# Patient Record
Sex: FEMALE | Race: BLACK OR AFRICAN AMERICAN | URBAN - METROPOLITAN AREA
[De-identification: names, ages, dates, MRNs, and addresses within clinical notes are randomized per-mention and may not be internally consistent; named-entity substitution may affect disease eponyms.]

---

## 2017-02-06 NOTE — PATIENT DISCUSSION
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 Advised patient to call our office with decreased vision or increased symptoms.

## 2017-02-06 NOTE — PATIENT DISCUSSION
(T64.357) Vitreous degeneration, bilateral - Assesment : Examination revealed PVD. DISCUSSED POSSIBILITY OF A LASER TO GET RID OF FLOATERS, ADVISED PATIENT THIS IS NOT COVERED BY INSURANCE. - Plan : Monitor for changes. Advised patient to call our office with decreased vision or an increase in flashes and/or floaters.  HANDOUT GIVEN

## 2017-02-06 NOTE — PATIENT DISCUSSION
(H35.373) Puckering of macula, bilateral - Assesment : Examination revealed ERM. OCT MAC PERFORMED TODAY - Plan : Monitor. Advised patient to call our office with decreased vision or increased symptoms.  RTC 1 YEAR EXAM/MAC OCT

## 2018-02-05 NOTE — PATIENT DISCUSSION
(H02.825) Cysts of left lower eyelid - Assesment : Examination revealed cysts on eyelid.    Lid cysts=- LLL MEDIAL NEAR PUNCTUM - Plan : MINOR SX  Lid cysts=- LLL MEDIAL NEAR PUNCTUM

## 2018-02-05 NOTE — PATIENT DISCUSSION
(H35.373) Puckering of macula, bilateral - Assesment : Examination revealed ERM. - UNCHANGED OU - Plan : Monitor. Advised patient to call our office with decreased vision or increased symptoms. RTC 1 YEAR EXAM/MAC OCT Optical coherence tomography performed.

## 2018-02-22 NOTE — PATIENT DISCUSSION
(H02.825) Cysts of left lower eyelid - Assesment : Examination revealed cysts on eyelid. Lid cysts=- LLL MEDIAL NEAR PUNCTUM - Plan : MINOR SX TODAY  Lid cysts=- LLL MEDIAL NEAR PUNCTUM  EMYCIN KIRSTIN APPLIED AFTER MINOR  USE EMYCIN KIRSTIN LLL BID X 1 WEEK THEN STOP.  [address] [place]  2/2019 EXAM

## 2019-02-04 NOTE — PATIENT DISCUSSION
(H35.373) Puckering of macula, bilateral - Assesment : Examination revealed ERM. -- TRACE, UNCHANGED OU - Plan : Monitor. Advised patient to call our office with decreased vision or increased symptoms. RTC 1 YEAR EXAM/MAC OCT Optical coherence tomography performed.

## 2020-07-20 ENCOUNTER — IMPORTED ENCOUNTER (OUTPATIENT)
Dept: URBAN - METROPOLITAN AREA CLINIC 9 | Facility: CLINIC | Age: 45
End: 2020-07-20

## 2021-09-13 ENCOUNTER — IMPORTED ENCOUNTER (OUTPATIENT)
Dept: URBAN - METROPOLITAN AREA CLINIC 9 | Facility: CLINIC | Age: 46
End: 2021-09-13

## 2021-10-16 ASSESSMENT — KERATOMETRY
OD_K1POWER_DIOPTERS: 44
OD_AXISANGLE2_DEGREES: 93
OS_K1POWER_DIOPTERS: 43.75
OS_AXISANGLE2_DEGREES: 107
OD_K2POWER_DIOPTERS: 44.5
OS_K2POWER_DIOPTERS: 44.5
OD_AXISANGLE_DEGREES: 3
OS_AXISANGLE_DEGREES: 17

## 2021-10-16 ASSESSMENT — TONOMETRY
OD_IOP_MMHG: 16
OS_IOP_MMHG: 15
OD_IOP_MMHG: 15
OS_IOP_MMHG: 14

## 2021-10-16 ASSESSMENT — VISUAL ACUITY
OD_SC: 20/25 + SN
OS_SC: 20/30 SN
OS_CC: 20/20 SN
OD_SC: 20/40 + SN
OS_SC: 20/20 -2 SN
OD_CC: 20/20 SN

## 2022-07-03 RX ORDER — DICLOFENAC SODIUM 75 MG/1
TABLET, DELAYED RELEASE ORAL
COMMUNITY
Start: 2022-04-21 | End: 2022-07-20

## 2022-10-27 ENCOUNTER — ESTABLISHED PATIENT (OUTPATIENT)
Dept: URBAN - METROPOLITAN AREA CLINIC 4 | Facility: CLINIC | Age: 47
End: 2022-10-27

## 2022-10-27 DIAGNOSIS — H04.123: ICD-10-CM

## 2022-10-27 PROBLEM — Z98.890 STATUS POST ARTHROSCOPY OF LEFT SHOULDER: Status: ACTIVE | Noted: 2022-10-27

## 2022-10-27 PROCEDURE — 92015 DETERMINE REFRACTIVE STATE: CPT

## 2022-10-27 PROCEDURE — 92014 COMPRE OPH EXAM EST PT 1/>: CPT

## 2022-10-27 ASSESSMENT — KERATOMETRY
OD_K2POWER_DIOPTERS: 45.00
OD_K1POWER_DIOPTERS: 44
OD_AXISANGLE_DEGREES: 177
OS_K1POWER_DIOPTERS: 43.50
OD_AXISANGLE2_DEGREES: 87
OS_AXISANGLE_DEGREES: 19
OS_K2POWER_DIOPTERS: 44.25
OS_AXISANGLE2_DEGREES: 109

## 2022-10-27 ASSESSMENT — TONOMETRY
OD_IOP_MMHG: 13
OS_IOP_MMHG: 12

## 2022-10-27 ASSESSMENT — VISUAL ACUITY
OS_GLARE: 20/40
OS_CC: 20/20
OU_CC: 20/20
OD_CC: 20/20
OD_GLARE: 20/40

## 2023-10-30 ENCOUNTER — ESTABLISHED PATIENT (OUTPATIENT)
Facility: LOCATION | Age: 48
End: 2023-10-30

## 2023-10-30 DIAGNOSIS — H04.123: ICD-10-CM

## 2023-10-30 DIAGNOSIS — E11.9: ICD-10-CM

## 2023-10-30 PROCEDURE — 92015 DETERMINE REFRACTIVE STATE: CPT

## 2023-10-30 PROCEDURE — 92014 COMPRE OPH EXAM EST PT 1/>: CPT

## 2023-10-30 ASSESSMENT — VISUAL ACUITY
OU_CC: 20/20-1
OS_CC: 20/25+1
OD_CC: 20/25

## 2023-10-30 ASSESSMENT — KERATOMETRY
OS_K1POWER_DIOPTERS: 43.50
OD_AXISANGLE_DEGREES: 165
OS_AXISANGLE2_DEGREES: 114
OD_AXISANGLE2_DEGREES: 75
OS_AXISANGLE_DEGREES: 24
OD_K2POWER_DIOPTERS: 44.00
OS_K2POWER_DIOPTERS: 44.25
OD_K1POWER_DIOPTERS: 43.75

## 2023-10-30 ASSESSMENT — TONOMETRY
OD_IOP_MMHG: 14
OS_IOP_MMHG: 12